# Patient Record
Sex: FEMALE | Race: BLACK OR AFRICAN AMERICAN | NOT HISPANIC OR LATINO | Employment: FULL TIME | URBAN - NONMETROPOLITAN AREA
[De-identification: names, ages, dates, MRNs, and addresses within clinical notes are randomized per-mention and may not be internally consistent; named-entity substitution may affect disease eponyms.]

---

## 2018-03-30 ENCOUNTER — TELEPHONE (OUTPATIENT)
Dept: FAMILY MEDICINE CLINIC | Facility: CLINIC | Age: 23
End: 2018-03-30

## 2018-03-30 NOTE — TELEPHONE ENCOUNTER
Yes I did but when is it?        This document has been electronically signed by Ally Garza MD on March 30, 2018 3:06 PM

## 2018-03-30 NOTE — TELEPHONE ENCOUNTER
DR ABRAHAM PT    PT MOTHER CALLED AND STATED DR ABRAHAM HAD TOLD HER THAT WHEN [place] SHE WOULD WORK HER IN HER SCHEDULE. PLEASE ADVISE

## 2018-03-30 NOTE — TELEPHONE ENCOUNTER
DR ABRAHAM PT    PT MOTHER STATES [place] TODAY AND DR ABRAHAM WOULD WORK HER IN TO BE SEEN. PLEASE ADVISE ON DATE AND TIME SHE CAN BE WORKED IN TO SCHEDULE

## 2018-05-04 ENCOUNTER — OFFICE VISIT (OUTPATIENT)
Dept: SLEEP MEDICINE | Facility: HOSPITAL | Age: 23
End: 2018-05-04

## 2018-05-04 VITALS
WEIGHT: 131 LBS | DIASTOLIC BLOOD PRESSURE: 85 MMHG | SYSTOLIC BLOOD PRESSURE: 134 MMHG | HEART RATE: 70 BPM | BODY MASS INDEX: 24.11 KG/M2 | HEIGHT: 62 IN

## 2018-05-04 DIAGNOSIS — S06.0X1S CONCUSSION WITH LOSS OF CONSCIOUSNESS OF 30 MINUTES OR LESS, SEQUELA (HCC): ICD-10-CM

## 2018-05-04 DIAGNOSIS — G47.10 HYPERSOMNIA: Primary | ICD-10-CM

## 2018-05-04 PROCEDURE — G0463 HOSPITAL OUTPT CLINIC VISIT: HCPCS

## 2018-05-04 RX ORDER — ZOLPIDEM TARTRATE 5 MG/1
5 TABLET ORAL NIGHTLY PRN
Qty: 1 TABLET | Refills: 0 | Status: SHIPPED | OUTPATIENT
Start: 2018-05-04

## 2018-05-04 NOTE — PROGRESS NOTES
"[facility]  Telephone: [phone] / Fax: [phone] [place]  Telephone: [phone] / Fax: [phone] Erin Castillo    Referring Physician: No Known Provider  PCP: No Known Provider    Reason for consult:  sleep apnea    Luis Ruiz is a 22 y.o.female  was seen in the [facility] today for evaluation of sleep apnea. Difficulty falling asleep for past 4 years. Once she falls asleep, she wakes up frequently sweating. She goes to bed at 10pm and up at 6:30am. She feels sleepy during the day and takes a nap during the day around 4:30pm. She snores at night. No witnessed apneas reported. She denies RLS.  No GERD that wakes her up. She tried melatonin but it did not help.  She denies sleep paralysis, hallucinations while falling asleep or waking up from sleep. No features of cataplexy reported. She denies use of alcohol or drugs. She had concussion last year-she was hit in a head playing soft ball.  She denies family hx of narcolepsy. She reports falling asleep during a conversation.    SH- works as an , 0 coffee, no tobacco.    ROS-negative    Luis Ruiz  has no past medical history on file.    Current Medications:  No current outpatient prescriptions on file.    I have reviewed Past Medical History, Past Surgical History, Medication List, Social History and Family History as entered in Sleep Questionnaire and EPIC.    ESS  16   Vital Signs /85   Pulse 70   Ht 157.5 cm (62\")   Wt 59.4 kg (131 lb)   BMI 23.96 kg/m²  Body mass index is 23.96 kg/m².    General Alert and oriented. No acute distress noted   Pharynx/Throat Class IV Mallampati airway, large tongue, no evidence of redundant lateral pharyngeal tissue. No oral lesions. No thrush. Moist mucous membranes..   Head Normocephalic. Symmetrical. Atraumatic.    Nose No septal deviation. No drainage   Chest Wall Normal shape. Symmetric expansion with respiration. No tenderness.   Neck Trachea " midline, no thyromegaly or adenopathy    Lungs Clear to auscultation bilaterally. No wheezes. No rhonchi. No rales. Respirations regular, even and unlabored.   Heart Regular rhythm and normal rate. Normal S1 and S2. No murmur   Abdomen Soft, non-tender and non-distended. Normal bowel sounds. No masses.   Extremities Moves all extremities well. No edema   Psychiatric Normal mood and affect.        .      .     Impression:  1. Hypersomnia    2. Concussion with loss of consciousness of 30 minutes or less, sequela          Plan  PSG +MSLT will be scheduled.  Low likelihood for ANASTASIA in view of minimal snoring, no gasping respiration and no witnessed apneas. She has significant hypersomnia with ESS 16. Hypersomnia increased since head concussion in the past year and there is a concern for secondary narcolepsy.     I discussed the pathophysiology of obstructive sleep apnea with the patient.  We discussed the adverse outcomes associated with untreated sleep-disordered breathing.  We discussed treatment modalities of obstructive sleep apnea including CPAP device as well as oral mandibular advancement device. Sleep study will be scheduled to establish definitive diagnosis of sleep disorder breathing.  Weight loss will be strongly beneficial in order to reduce the severity of sleep-disordered breathing.  Patient has narrow oropharyngeal structure.  Caution during activities that require prolonged concentration is strongly advised.  Patient will be notified of sleep study results after sleep study is completed.  If sleep apnea is only mild,  oral mandibular advancement device may be one of the treatment options.  However if sleep apnea is moderately severe, CPAP treatment will be strongly encouraged.  The patient is not opposed to treatment with CPAP device if we confirm significant obstructive sleep apnea on polysomnography. Prescription for Ambien was provided to bring to the Sleep lab to improve sleep efficiency.  Patient was  asked to not take the Ambien at home.    Thank you for allowing me to participate in your patient's care.    The patient will follow up with Dr. Go after completion of polysomnography.    YULI Kinsey  [facility]  Phone: [phone]      Part of this note may be an electronic transcription/translation of spoken language to printed text using the Dragon Dictation System. Some errors may exist even though the document was edited.

## 2018-05-18 ENCOUNTER — TRANSCRIBE ORDERS (OUTPATIENT)
Dept: SLEEP MEDICINE | Facility: HOSPITAL | Age: 23
End: 2018-05-18

## 2018-05-18 DIAGNOSIS — G47.10 HYPERSOMNIA: Primary | ICD-10-CM

## 2018-05-31 ENCOUNTER — HOSPITAL ENCOUNTER (OUTPATIENT)
Dept: SLEEP MEDICINE | Facility: HOSPITAL | Age: 23
Discharge: HOME | End: 2018-05-31
Admitting: NURSE PRACTITIONER

## 2018-05-31 DIAGNOSIS — G47.10 HYPERSOMNIA: ICD-10-CM

## 2018-05-31 PROCEDURE — 95806 SLEEP STUDY UNATT&RESP EFFT: CPT

## 2018-06-08 ENCOUNTER — TELEPHONE (OUTPATIENT)
Dept: SLEEP MEDICINE | Facility: HOSPITAL | Age: 23
End: 2018-06-08

## 2018-06-08 NOTE — TELEPHONE ENCOUNTER
Michell spoke with patient about HST results. Pt voiced understanding of proposed testing with in lab NPSG and MSLT to follow.  Pt is currently unable to choose a date for testing.  Pt was asked to call back and speak to [facility] to schedule NPSG w/ MSLT to follow so that pre-certification process can be initiated. artemio

## 2020-07-26 PROCEDURE — 87635 SARS-COV-2 COVID-19 AMP PRB: CPT | Performed by: NURSE PRACTITIONER

## 2021-05-08 ENCOUNTER — IMMUNIZATION (OUTPATIENT)
Dept: VACCINE CLINIC | Facility: HOSPITAL | Age: 26
End: 2021-05-08

## 2021-05-08 PROCEDURE — 91300 HC SARSCOV02 VAC 30MCG/0.3ML IM: CPT | Performed by: INTERNAL MEDICINE

## 2021-05-08 PROCEDURE — 0001A: CPT | Performed by: INTERNAL MEDICINE

## 2021-05-29 ENCOUNTER — IMMUNIZATION (OUTPATIENT)
Dept: VACCINE CLINIC | Facility: HOSPITAL | Age: 26
End: 2021-05-29

## 2021-05-29 PROCEDURE — 91300 HC SARSCOV02 VAC 30MCG/0.3ML IM: CPT | Performed by: INTERNAL MEDICINE

## 2021-05-29 PROCEDURE — 0002A: CPT | Performed by: INTERNAL MEDICINE
